# Patient Record
Sex: FEMALE | URBAN - METROPOLITAN AREA
[De-identification: names, ages, dates, MRNs, and addresses within clinical notes are randomized per-mention and may not be internally consistent; named-entity substitution may affect disease eponyms.]

---

## 2019-12-27 ENCOUNTER — TELEPHONE (OUTPATIENT)
Dept: DERMATOLOGY | Facility: CLINIC | Age: 59
End: 2019-12-27

## 2023-01-26 NOTE — TELEPHONE ENCOUNTER
----- Message from Hien Grossman sent at 12/27/2019  7:30 AM CST -----  Contact: Mr Mckeon /   father [phone]  Type:  Reschedule Appointment Request    Name of Caller:Mr Mckeon states need to speak with nurse    Patient has appointment for 1/9/2020   Patient need to reschedule for Monday , Tuesday or Wednesday.   Please call Mr Mckeon for more detail info    When is the first available appointment?  Symptoms:  Would the patient rather a call back or a response via [facility]? call  Best Call Back Number: [phone]  Additional Information:   
LVS 12/27 [facility]  
comfortable appearance